# Patient Record
Sex: FEMALE | Race: WHITE
[De-identification: names, ages, dates, MRNs, and addresses within clinical notes are randomized per-mention and may not be internally consistent; named-entity substitution may affect disease eponyms.]

---

## 2018-07-05 ENCOUNTER — HOSPITAL ENCOUNTER (EMERGENCY)
Dept: HOSPITAL 33 - ED | Age: 48
Discharge: HOME | End: 2018-07-05
Payer: COMMERCIAL

## 2018-07-05 VITALS — OXYGEN SATURATION: 98 % | SYSTOLIC BLOOD PRESSURE: 142 MMHG | HEART RATE: 75 BPM | DIASTOLIC BLOOD PRESSURE: 74 MMHG

## 2018-07-05 DIAGNOSIS — R10.9: Primary | ICD-10-CM

## 2018-07-05 DIAGNOSIS — K59.00: ICD-10-CM

## 2018-07-05 LAB
ALBUMIN SERPL-MCNC: 4.4 G/DL (ref 3.5–5)
ALP SERPL-CCNC: 64 U/L (ref 38–126)
ALT SERPL-CCNC: 20 U/L (ref 0–35)
AMYLASE SERPL-CCNC: 44 U/L (ref 30–110)
ANION GAP SERPL CALC-SCNC: 15 MEQ/L (ref 5–15)
AST SERPL QL: 26 U/L (ref 14–36)
BASOPHILS # BLD AUTO: 0.03 10*3/UL (ref 0–0.4)
BASOPHILS NFR BLD AUTO: 0.4 % (ref 0–0.4)
BILIRUB BLD-MCNC: 0.5 MG/DL (ref 0.2–1.3)
BUN SERPL-MCNC: 13 MG/DL (ref 7–17)
CALCIUM SPEC-MCNC: 9.2 MG/DL (ref 8.4–10.2)
CHLORIDE SERPL-SCNC: 105 MMOL/L (ref 98–107)
CO2 SERPL-SCNC: 23 MMOL/L (ref 22–30)
CREAT SERPL-MCNC: 0.91 MG/DL (ref 0.52–1.04)
EOSINOPHIL # BLD AUTO: 0.29 10*3/UL (ref 0–0.5)
GLUCOSE SERPL-MCNC: 93 MG/DL (ref 74–106)
GLUCOSE UR-MCNC: NEGATIVE MG/DL
GRANULOCYTES # BLD AUTO: 4.64 10*3/UL (ref 1.4–6.9)
HCT VFR BLD AUTO: 38.2 % (ref 35–47)
HGB BLD-MCNC: 12.5 GM/DL (ref 12–16)
LIPASE SERPL-CCNC: 48 U/L (ref 23–300)
LYMPHOCYTES # SPEC AUTO: 1.74 10*3/UL (ref 1–4.6)
MCH RBC QN AUTO: 28.5 PG (ref 26–32)
MCHC RBC AUTO-ENTMCNC: 32.7 G/DL (ref 32–36)
MONOCYTES # BLD AUTO: 0.5 10*3/UL (ref 0–1.3)
NEUTROPHILS NFR BLD AUTO: 64.5 % (ref 36–66)
PLATELET # BLD AUTO: 275 K/MM3 (ref 150–450)
POTASSIUM SERPLBLD-SCNC: 4.3 MMOL/L (ref 3.5–5.1)
PROT SERPL-MCNC: 7.4 G/DL (ref 6.3–8.2)
PROT UR STRIP-MCNC: NEGATIVE MG/DL
RBC # BLD AUTO: 4.39 M/MM3 (ref 4.1–5.4)
RBC # URNS HPF: (no result) /HPF (ref 0–2)
SODIUM SERPL-SCNC: 139 MMOL/L (ref 137–145)
WBC # BLD AUTO: 7.2 K/MM3 (ref 4–10.5)
WBC URNS QL MICRO: (no result) /HPF (ref 0–5)

## 2018-07-05 PROCEDURE — 96360 HYDRATION IV INFUSION INIT: CPT

## 2018-07-05 PROCEDURE — 87086 URINE CULTURE/COLONY COUNT: CPT

## 2018-07-05 PROCEDURE — 99284 EMERGENCY DEPT VISIT MOD MDM: CPT

## 2018-07-05 PROCEDURE — 80053 COMPREHEN METABOLIC PANEL: CPT

## 2018-07-05 PROCEDURE — 83690 ASSAY OF LIPASE: CPT

## 2018-07-05 PROCEDURE — 36415 COLL VENOUS BLD VENIPUNCTURE: CPT

## 2018-07-05 PROCEDURE — 81000 URINALYSIS NONAUTO W/SCOPE: CPT

## 2018-07-05 PROCEDURE — 84703 CHORIONIC GONADOTROPIN ASSAY: CPT

## 2018-07-05 PROCEDURE — 85025 COMPLETE CBC W/AUTO DIFF WBC: CPT

## 2018-07-05 PROCEDURE — 82150 ASSAY OF AMYLASE: CPT

## 2018-07-05 PROCEDURE — 82272 OCCULT BLD FECES 1-3 TESTS: CPT

## 2018-07-05 PROCEDURE — 36000 PLACE NEEDLE IN VEIN: CPT

## 2018-07-05 PROCEDURE — 74022 RADEX COMPL AQT ABD SERIES: CPT

## 2018-07-05 NOTE — XRAY
Indication: Abdominal pain.



Comparison: None



2 views of the abdomen nonacute and nonobstructed with mild scattered colonic

fecal debris.  A few pelvic phleboliths and tampon in situ.  Remaining solid

organs and osseous structures unremarkable.



Single PA chest demonstrates normal heart, lungs, and bony thorax.



Impression:

1.  Mild fecal stasis without obstruction.

2.  Normal 1 view chest.

## 2018-07-05 NOTE — ERPHSYRPT
- History of Present Illness


Time Seen by Provider: 07/05/18 12:03


Historian: patient


Exam Limitations: no limitations


Patient Subjective Stated Complaint: Pt states "I am having abdominal pain, I 

think it is gas or maybe constipation.  It has been onging for a little over a 

week and when I have a bowel movement, it is very small and I drank one of 

those mag citrate things from the store and I had a small bowel movement and 

then it was just water."


Triage Nursing Assessment: Pt alert and oriented X 3, skin pwd.  PT extremely 

anxious, ambulates with an upright steady gait, able to speak in clear full 

sentences.  PT in no apparent respiratory distress.


Physician History: 





This is a 47-year-old white female arrives with complaint of abdominal pain for 

one week it is located in various spots anywhere from the suprapubic region to 

the periumbilical region to the epigastric region.


She states that she's had decreased bowel movements with small amounts of stool





No vomiting no diarrhea





Patient states she thought she was constipated so took magnesium citrate but 

this did not help.


Past medical history includes migraines.





Past surgical history includes tubal ligation.





Timing/Duration: week(s) (one week)


Activities at Onset: none


Quality: cramping


Abdominal Pain Onset Location: epigastric, periumbilical, suprapubic, other


Pain Radiation: no radiation


Severity of Pain-Max: moderate


Severity of Pain-Current: mild


Modifying Factors: Improves With: nothing


Associated Symptoms: No back, No chest pain, No diaphoresis, No diarrhea, No 

fever/chills, No fatigue, No headache, No heartburn, No loss of appetite, No 

nausea, No neck pain, No rash, No shortness of breath, No syncope, No vomiting, 

No weakness


Previous symptoms: no prior history


Allergies/Adverse Reactions: 








Sulfa (Sulfonamide Antibiotics) Allergy (Severe, Verified 07/05/18 12:01)


 Swelling





Home Medications: 








No Reportable Medications [No Reported Medications]  07/05/18 [History]





Hx Tetanus, Diphtheria Vaccination/Date Given: No


Hx Influenza Vaccination/Date Given: No


Hx Pneumococcal Vaccination/Date Given: No


Immunizations Up to Date: Yes





- Review of Systems


Constitutional: No Fever, No Chills


Eyes: No Symptoms


Ears, Nose, & Throat: No Symptoms


Respiratory: No Cough, No Dyspnea


Cardiac: No Chest Pain, No Edema, No Syncope


Abdominal/Gastrointestinal: Abdominal Pain, Constipation


Genitourinary Symptoms: No Dysuria


Musculoskeletal: No Back Pain, No Neck Pain


Skin: No Rash


Neurological: No Dizziness, No Focal Weakness, No Sensory Changes


Psychological: No Symptoms


Endocrine: No Symptoms


All Other Systems: Reviewed and Negative





- Past Medical History


Pertinent Past Medical History: Yes


Other Medical History: migraines





- Past Surgical History


Past Surgical History: Yes


Other Surgical History: tubal





- Social History


Smoking Status: Never smoker


Exposure to second hand smoke: No


Drug Use: none


Patient Lives Alone: No





- Female History


Hx Last Menstrual Period: 07/05/2018


Hx Pregnant Now: No





- Nursing Vital Signs


Nursing Vital Signs: 


 Initial Vital Signs











Temperature  98.0 F   07/05/18 11:52


 


Pulse Rate  92 H  07/05/18 11:52


 


Respiratory Rate  18   07/05/18 11:52


 


Blood Pressure  131/97   07/05/18 11:52


 


O2 Sat by Pulse Oximetry  99   07/05/18 11:52








 Pain Scale











Pain Intensity                 4

















- Physical Exam


General Appearance: no apparent distress, alert


Eye Exam: PERRL/EOMI, eyes nml inspection


Ears, Nose, Throat Exam: normal ENT inspection, pharynx normal, moist mucous 

membranes


Neck Exam: normal inspection, non-tender, supple, full range of motion


Respiratory Exam: normal breath sounds, lungs clear, No respiratory distress


Cardiovascular Exam: regular rate/rhythm, normal heart sounds


Gastrointestinal/Abdomen Exam: soft, No tenderness, No mass


Rectal Exam: normal exam, normal rectal tone, No blood


Back Exam: normal inspection, normal range of motion, No CVA tenderness, No 

vertebral tenderness


Extremity Exam: normal inspection, normal range of motion, pelvis stable


Neurologic Exam: alert, oriented x 3, cooperative, CNs II-XII nml as tested, 

normal mood/affect, nml cerebellar function, sensation nml, No motor deficits


Skin Exam: normal color, warm, dry


**SpO2 Interpretation**: normal (99%)


SpO2: 99


Oxygen Delivery: Room Air





- Course


Nursing assessment & vital signs reviewed: Yes





- Radiology Exams


  ** Abdomen


X-ray Interpretation: Discussed w/ radiologist (three-view abdomen series: 1.  

Mild fecal stasis without obstruction 2.  Normal 1 view chest)


Ordered Tests: 


 Active Orders 24 hr











 Category Date Time Status


 


 IV Insertion STAT Care  07/05/18 12:06 Active


 


 OBSTR/ACUTE ABDOMEN SERIES Stat Exams  07/05/18 13:03 Completed


 


 AMYLASE Stat Lab  07/05/18 12:17 Completed


 


 CBC W DIFF Stat Lab  07/05/18 12:17 Completed


 


 CMP Stat Lab  07/05/18 12:17 Completed


 


 CULTURE,URINE Stat Lab  07/05/18 14:15 Received


 


 HCG QUALITATIVE,SERUM Stat Lab  07/05/18 12:17 Completed


 


 LIPASE Stat Lab  07/05/18 12:17 Completed


 


 Occult Blood,Stool Other Stat Lab  07/05/18 12:28 Completed


 


 UA W/ MICROSCOPIC Stat Lab  07/05/18 14:15 Completed








Medication Summary














Discontinued Medications














Generic Name Dose Route Start Last Admin





  Trade Name Charlene  PRN Reason Stop Dose Admin


 


Sodium Chloride  1,000 mls @ 999 mls/hr  07/05/18 12:06  07/05/18 12:31





  Sodium Chloride 0.9% 1000 Ml  IV  07/05/18 13:06  999 mls/hr





  .Q1H1M STA   Administration





     





     





     





     


 


Sodium Chloride  Confirm  07/05/18 12:10  





  Sodium Chloride 0.9% 1000 Ml  Administered  07/05/18 12:11  





  Dose   





  1,000 mls @ ud   





  .ROUTE   





  .K-MED ONE   





     





     





     





     











Lab/Rad Data: 


 Laboratory Result Diagrams





 07/05/18 12:17 





 07/05/18 12:17 





 Laboratory Results











  07/05/18 07/05/18 07/05/18 Range/Units





  14:15 12:28 12:17 


 


WBC     (4.0-10.5)  K/mm3


 


RBC     (4.1-5.4)  M/mm3


 


Hgb     (12.0-16.0)  gm/dl


 


Hct     (35-47)  %


 


MCV     ()  fl


 


MCH     (26-32)  pg


 


MCHC     (32-36)  g/dl


 


RDW     (11.5-14.0)  %


 


Plt Count     (150-450)  K/mm3


 


MPV     (6-9.5)  fl


 


Gran %     (36.0-66.0)  %


 


Eos # (Auto)     (0-0.5)  


 


Absolute Lymphs (auto)     (1.0-4.6)  


 


Absolute Monos (auto)     (0.0-1.3)  


 


Lymphocytes %     (24.0-44.0)  %


 


Monocytes %     (0.0-12.0)  %


 


Eosinophils %     (0.00-5.0)  %


 


Basophils %     (0.0-0.4)  %


 


Absolute Granulocytes     (1.4-6.9)  


 


Basophils #     (0-0.4)  


 


Sodium     (137-145)  mmol/L


 


Potassium     (3.5-5.1)  mmol/L


 


Chloride     ()  mmol/L


 


Carbon Dioxide     (22-30)  mmol/L


 


Anion Gap     (5-15)  MEQ/L


 


BUN     (7-17)  mg/dL


 


Creatinine     (0.52-1.04)  mg/dL


 


Estimated GFR     ML/MIN


 


Glucose     ()  mg/dL


 


Calcium     (8.4-10.2)  mg/dL


 


Total Bilirubin     (0.2-1.3)  mg/dL


 


AST     (14-36)  U/L


 


ALT     (0-35)  U/L


 


Alkaline Phosphatase     ()  U/L


 


Serum Total Protein     (6.3-8.2)  g/dL


 


Albumin     (3.5-5.0)  g/dL


 


Amylase     ()  U/L


 


Lipase     ()  U/L


 


Serum Pregnancy, Qual    NEGATIVE  (Negative)  


 


Ur Collection Type  VOID    


 


Urine Color  YELLOW    (YELLOW)  


 


Urine Appearance  CLEAR    (CLEAR)  


 


Urine pH  5.0    (5-6)  


 


Ur Specific Gravity  1.010    (1.005-1.025)  


 


Urine Protein  NEGATIVE    (Negative)  


 


Urine Ketones  NEGATIVE    (NEGATIVE)  


 


Urine Blood  250    (0-5)  Josh/ul


 


Urine Nitrite  NEGATIVE    (NEGATIVE)  


 


Urine Bilirubin  NEGATIVE    (NEGATIVE)  


 


Urine Urobilinogen  NORMAL    (0-1)  mg/dL


 


Ur Leukocyte Esterase  TRACE    (NEGATIVE)  


 


Urine Microscopic RBC  5-10    (0-2)  /HPF


 


Urine Microscopic WBC  0-2    (0-5)  /HPF


 


Ur Epithelial Cells  FEW    (FEW)  /HPF


 


Urine Bacteria  FEW    (NEGATIVE)  /HPF


 


Urine Culture Reflexed  YES    (NO)  


 


Urine Glucose  NEGATIVE    (NEGATIVE)  mg/dL


 


Stool Occult Blood   NEGATIVE   (Negative)  


 


Specimen Received  7/5/18 1415    














  07/05/18 07/05/18 Range/Units





  12:17 12:17 


 


WBC   7.2  (4.0-10.5)  K/mm3


 


RBC   4.39  (4.1-5.4)  M/mm3


 


Hgb   12.5  (12.0-16.0)  gm/dl


 


Hct   38.2  (35-47)  %


 


MCV   87.0  ()  fl


 


MCH   28.5  (26-32)  pg


 


MCHC   32.7  (32-36)  g/dl


 


RDW   14.8 H  (11.5-14.0)  %


 


Plt Count   275  (150-450)  K/mm3


 


MPV   9.6 H  (6-9.5)  fl


 


Gran %   64.5  (36.0-66.0)  %


 


Eos # (Auto)   0.29  (0-0.5)  


 


Absolute Lymphs (auto)   1.74  (1.0-4.6)  


 


Absolute Monos (auto)   0.50  (0.0-1.3)  


 


Lymphocytes %   24.2  (24.0-44.0)  %


 


Monocytes %   6.9  (0.0-12.0)  %


 


Eosinophils %   4.0  (0.00-5.0)  %


 


Basophils %   0.4  (0.0-0.4)  %


 


Absolute Granulocytes   4.64  (1.4-6.9)  


 


Basophils #   0.03  (0-0.4)  


 


Sodium  139   (137-145)  mmol/L


 


Potassium  4.3   (3.5-5.1)  mmol/L


 


Chloride  105   ()  mmol/L


 


Carbon Dioxide  23   (22-30)  mmol/L


 


Anion Gap  15.0   (5-15)  MEQ/L


 


BUN  13   (7-17)  mg/dL


 


Creatinine  0.91   (0.52-1.04)  mg/dL


 


Estimated GFR  > 60.0   ML/MIN


 


Glucose  93   ()  mg/dL


 


Calcium  9.2   (8.4-10.2)  mg/dL


 


Total Bilirubin  0.50   (0.2-1.3)  mg/dL


 


AST  26   (14-36)  U/L


 


ALT  20   (0-35)  U/L


 


Alkaline Phosphatase  64   ()  U/L


 


Serum Total Protein  7.4   (6.3-8.2)  g/dL


 


Albumin  4.4   (3.5-5.0)  g/dL


 


Amylase  44   ()  U/L


 


Lipase  48   ()  U/L


 


Serum Pregnancy, Qual    (Negative)  


 


Ur Collection Type    


 


Urine Color    (YELLOW)  


 


Urine Appearance    (CLEAR)  


 


Urine pH    (5-6)  


 


Ur Specific Gravity    (1.005-1.025)  


 


Urine Protein    (Negative)  


 


Urine Ketones    (NEGATIVE)  


 


Urine Blood    (0-5)  Josh/ul


 


Urine Nitrite    (NEGATIVE)  


 


Urine Bilirubin    (NEGATIVE)  


 


Urine Urobilinogen    (0-1)  mg/dL


 


Ur Leukocyte Esterase    (NEGATIVE)  


 


Urine Microscopic RBC    (0-2)  /HPF


 


Urine Microscopic WBC    (0-5)  /HPF


 


Ur Epithelial Cells    (FEW)  /HPF


 


Urine Bacteria    (NEGATIVE)  /HPF


 


Urine Culture Reflexed    (NO)  


 


Urine Glucose    (NEGATIVE)  mg/dL


 


Stool Occult Blood    (Negative)  


 


Specimen Received    














- Progress


Progress: improved


Progress Note: 





07/05/18 14:50


47-year-old white female arrives with complaints of abdominal pain in 

alternating places such as epigastric periumbilical and suprapubic going on for 

a week,


She feels like she has been constipated she did take mag citrate of with some 

relief but continues to have the pain,


On physical examination patient is nontender with palpation acute abdomen 

series shows mild fecal stasis without obstruction,


Patient's CBC CMP and hCG are all within normal limits urine shows 5-10 red 

cells,








Will have patient go home take Ирина lax one scoop in 8 ounces of water orally 

daily (patient states she has this at home) Will also place her on clear fluids 

24-48 hours and Dulcolax suppositories.








- Departure


Time of Disposition: 14:51


Departure Disposition: Home


Clinical Impression: 


Abdominal pain


Qualifiers:


 Abdominal location: generalized Qualified Code(s): R10.84 - Generalized 

abdominal pain





Constipation


Qualifiers:


 Constipation type: unspecified constipation type Qualified Code(s): K59.00 - 

Constipation, unspecified





Condition: Fair


Critical Care Time: No


Referrals: 


DOCTOR,NO FAMILY [Primary Care Provider] - 


Additional Instructions: 


Return home.


Plenty of fluids clear fluids only 24-48 hours if abdominal pain.


Ирина lax one scoop in 8 ounces of water orally daily.


Dulcolax suppositories one rectally as needed for no stool in 48 hours.


Follow-up with your family doctor.


Return for acute distress or for severe symptoms.